# Patient Record
Sex: MALE | Race: WHITE | NOT HISPANIC OR LATINO | Employment: FULL TIME | URBAN - METROPOLITAN AREA
[De-identification: names, ages, dates, MRNs, and addresses within clinical notes are randomized per-mention and may not be internally consistent; named-entity substitution may affect disease eponyms.]

---

## 2018-06-13 ENCOUNTER — EVALUATION (OUTPATIENT)
Dept: PHYSICAL THERAPY | Facility: CLINIC | Age: 48
End: 2018-06-13
Payer: COMMERCIAL

## 2018-06-13 ENCOUNTER — TRANSCRIBE ORDERS (OUTPATIENT)
Dept: PHYSICAL THERAPY | Facility: CLINIC | Age: 48
End: 2018-06-13

## 2018-06-13 DIAGNOSIS — Z98.890 S/P LUMBAR DISCECTOMY: Primary | ICD-10-CM

## 2018-06-13 DIAGNOSIS — Z98.890 S/P LUMBAR DISCECTOMY: ICD-10-CM

## 2018-06-13 DIAGNOSIS — M54.50 BILATERAL LOW BACK PAIN WITHOUT SCIATICA, UNSPECIFIED CHRONICITY: Primary | ICD-10-CM

## 2018-06-13 PROCEDURE — G8990 OTHER PT/OT CURRENT STATUS: HCPCS

## 2018-06-13 PROCEDURE — G8991 OTHER PT/OT GOAL STATUS: HCPCS

## 2018-06-13 PROCEDURE — 97161 PT EVAL LOW COMPLEX 20 MIN: CPT

## 2018-06-13 PROCEDURE — 97110 THERAPEUTIC EXERCISES: CPT

## 2018-06-13 NOTE — LETTER
2018    Leandro Campos MD  [address]    Patient: Cheryle Barns   YOB: 1970   Date of Visit: 2018     Encounter Diagnosis     ICD-10-CM    1  S/P lumbar discectomy Z98 890        Dear Dr Ortiz Lora:    Please review the attached Plan of Care from [facility] recent visit  Please verify that you agree therapy should continue by signing the attached document and sending it back to our office  If you have any questions or concerns, please don't hesitate to call  Sincerely,    Asif Oakley PT      Referring Provider:      I certify that I have read the below Plan of Care and certify the need for these services furnished under this plan of treatment while under my care  Leandro Campos MD  [place] [id number]  [address]: [phone]          PT Evaluation     Today's date: 2018  Patient name: Cheryle Barns  : 1970  MRN: [id number]  Referring provider: Raven Campbell MD  Dx:   Encounter Diagnosis     ICD-10-CM    1  S/P lumbar discectomy Z98 890                   Assessment  Impairments: abnormal or restricted ROM, activity intolerance, impaired physical strength, lacks appropriate home exercise program, pain with function and poor body mechanics    Assessment details: Cheryle Barns is a 50 y o  male presents s/p L5-S1 discectomy on 5/3/18  Cheryle Barns has the above listed impairments and will benefit from skilled PT to improve deficits to return to prior level of function  Cheryle Barns was educated on eval findings and plan for management  Educated extensively on avoiding lumbar flexion with transfers, lifting, and ADL to prevent re-herniation  HEP initiated  Coit Idana would benefit from skilled services to improve ROM, strength, flexibility, and function, and to decrease pain      Understanding of Dx/Px/POC: good   Prognosis: good    Goals  2 wks  - No pain > 4/10  - Increase strength 1/3 grade  - Increase sitting tolerance at least 50%    6-8  wks  - Pain 0/10  - Strength 5/5  - Lumbar ROM WFL and painfree including repeated flexion in standing   - Independent with HEP for self management  - Functional Status Measure at least 61  - Return to baseline tolerance for sitting, donning socks/shoes  - RTW normal duty  Plan  Patient would benefit from: skilled physical therapy  Planned modality interventions: cryotherapy and thermotherapy: hydrocollator packs  Planned therapy interventions: patient education, postural training, strengthening, stretching, therapeutic exercise, transfer training, home exercise program and flexibility  Frequency: 3x week  Duration in visits: 12  Duration in weeks: 4  Treatment plan discussed with: patient        Subjective Evaluation    History of Present Illness  Date of surgery: 5/3/2018  Mechanism of injury: surgery  Mechanism of injury: Pt reporting RLE pain x 2 yrs, insidious onset  Does hard work shoveling, raking which may have contributed to onset  Would go to chiro for manipulation which managed RLE sx up until 1 yr ago, then chiro was ineffective  Pt had spine surgery consult, received MRI revealing R sided L5-S1 disc derangement  Now s/p R L5-S1 discectomy on 5/3/18  Chief complaint post op "I just have pain in my back, the leg is fine  I don't like sitting here" with LBP with prolonged sitting  Has been doing a lot of walking post-op  Pain  Current pain ratin  At best pain rating: 3  At worst pain ratin  Location: B lumbosacral  Quality: sharp  Relieving factors: change in position  Aggravating factors: sitting  Progression: improved (No RLE pain post op)    Social Support  Steps to enter house: yes  5  Stairs in house: yes   15  Lives in: multiple-level home  Lives with: spouse and adult children    Employment status: not working (Off from work post-op    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, shoveling, running equipment)    Diagnostic Tests  MRI studies: abnormal (R L5-S1 disc derangement pre-op)  Treatments  Previous treatment: chiropractic  Patient Goals  Patient goals for therapy: decreased pain, return to work, independence with ADLs/IADLs, return to sport/leisure activities, increased strength and increased motion          Objective    Posture/observation: 5 cm vertical lumbar incision healing well  Increased lumbar lordosis  Flowsheet Rows      Most Recent Value   PT/OT G-Codes   Current Score  40   Projected Score  61   FOTO information reviewed  Yes   Assessment Type  Evaluation   G code set  Other PT/OT Primary   Other PT Primary Current Status ()  CL   Other PT Primary Goal Status ()  CJ      Gait: no AD, normal     OH squat: normal, good balance  Pt able to heel walk, toe walk B  MMT Lumbosacral myotomes 5/5 B to include iliospoas, quadriceps, anterior tibialis, extensor hallucis, peroneals, and hamstrings  B glute med 5/5  Glute max: R 4-/5, L 4/5  Transverse abdominis cx Fair via palpation  Lumbar ROM: deferred post-op  Flexibility: B hamstrings moderately to severely tight via SLR  Hip flexors: R normal, L moderately tight  B gastroc moderately tight  Special tests: B SLR, Noreen's (-)  No LLD in supine        Precautions: no flexion ROM s/p discectomy    Daily Treatment Diary       Manuals 6/13 /18                                                                Exercise Diary              TA cx 10"x 15            Bridge 10" x15            H/L HS str             T-ball squat             Wall gastroc str             Kneel HF str             TM/elliptical             FSU             Prone plank             Prone swim                                                                                                                                                                         Modalities             MH PRN low back             Ice low back PRN

## 2018-06-13 NOTE — PROGRESS NOTES
PT Evaluation     Today's date: 2018  Patient name: Michael Mcgovern  : 1970  MRN: [id number]  Referring provider: Vandana Collins MD  Dx:   Encounter Diagnosis     ICD-10-CM    1  Bilateral low back pain without sciatica, unspecified chronicity M54 5    2  S/P lumbar discectomy Z98 890        Start Time: 1046  Stop Time: 3274  Total time in clinic (min): 36 minutes    Assessment  Impairments: abnormal or restricted ROM, activity intolerance, impaired physical strength, lacks appropriate home exercise program, pain with function and poor body mechanics    Assessment details: Michael Mcgovern is a 50 y o  male presents s/p L5-S1 discectomy on 5/3/18  Michael Mcgovern has the above listed impairments and will benefit from skilled PT to improve deficits to return to prior level of function  Michael Mcgovern was educated on eval findings and plan for management  Educated extensively on avoiding lumbar flexion with transfers, lifting, and ADL to prevent re-herniation  HEP initiated  Sandra Pastel would benefit from skilled services to improve ROM, strength, flexibility, and function, and to decrease pain  Understanding of Dx/Px/POC: good   Prognosis: good    Goals  2 wks  - No pain > 4/10  - Increase strength 1/3 grade  - Increase sitting tolerance at least 50%    6-8  wks  - Pain 0/10  - Strength 5/5  - Lumbar ROM WFL and painfree including repeated flexion in standing   - Independent with HEP for self management  - Functional Status Measure at least 61  - Return to baseline tolerance for sitting, donning socks/shoes  - RTW normal duty        Plan  Patient would benefit from: skilled physical therapy  Planned modality interventions: cryotherapy and thermotherapy: hydrocollator packs  Planned therapy interventions: patient education, postural training, strengthening, stretching, therapeutic exercise, transfer training, home exercise program and flexibility  Frequency: 3x week  Duration in visits: 12  Duration in weeks: 4  Treatment plan discussed with: patient        Subjective Evaluation    History of Present Illness  Date of surgery: 5/3/2018  Mechanism of injury: surgery  Mechanism of injury: Pt reporting RLE pain x 2 yrs, insidious onset  Does hard work shoveling, raking which may have contributed to onset  Would go to chiro for manipulation which managed RLE sx up until 1 yr ago, then chiro was ineffective  Pt had spine surgery consult, received MRI revealing R sided L5-S1 disc derangement  Now s/p R L5-S1 discectomy on 5/3/18  Chief complaint post op "I just have pain in my back, the leg is fine  I don't like sitting here" with LBP with prolonged sitting  Has been doing a lot of walking post-op  Pain  Current pain ratin  At best pain rating: 3  At worst pain ratin  Location: B lumbosacral  Quality: sharp  Relieving factors: change in position  Aggravating factors: sitting  Progression: improved (No RLE pain post op)    Social Support  Steps to enter house: yes  5  Stairs in house: yes   15  Lives in: multiple-level home  Lives with: spouse and adult children    Employment status: not working (Off from work post-op  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, shoveling, running equipment)    Diagnostic Tests  MRI studies: abnormal (R L5-S1 disc derangement pre-op)  Treatments  Previous treatment: chiropractic  Patient Goals  Patient goals for therapy: decreased pain, return to work, independence with ADLs/IADLs, return to sport/leisure activities, increased strength and increased motion          Objective    Posture/observation: 5 cm vertical lumbar incision healing well  Increased lumbar lordosis      Flowsheet Rows      Most Recent Value   PT/OT G-Codes   Current Score  40   Projected Score  61   FOTO information reviewed  Yes   Assessment Type  Evaluation   G code set  Other PT/OT Primary   Other PT Primary Current Status ()  CL   Other PT Primary Goal Status ()  CJ      Gait: no AD, normal     OH squat: normal, good balance  Pt able to heel walk, toe walk B  MMT Lumbosacral myotomes 5/5 B to include iliospoas, quadriceps, anterior tibialis, extensor hallucis, peroneals, and hamstrings  B glute med 5/5  Glute max: R 4-/5, L 4/5  Transverse abdominis cx Fair via palpation  Lumbar ROM: deferred post-op  Flexibility: B hamstrings moderately to severely tight via SLR  Hip flexors: R normal, L moderately tight  B gastroc moderately tight  Special tests: B SLR, Noreen's (-)  No LLD in supine        Precautions: no flexion ROM s/p discectomy    Daily Treatment Diary       Manuals 6/13 /18                                                                Exercise Diary              TA cx 10"x 15            Bridge 10" x15            H/L HS str             T-ball squat             Wall gastroc str             Kneel HF str             TM/elliptical             FSU             Prone plank             Prone swim                                                                                                                                                                         Modalities             MH PRN low back             Ice low back PRN

## 2018-06-14 ENCOUNTER — OFFICE VISIT (OUTPATIENT)
Dept: PHYSICAL THERAPY | Facility: CLINIC | Age: 48
End: 2018-06-14
Payer: COMMERCIAL

## 2018-06-14 DIAGNOSIS — M54.50 BILATERAL LOW BACK PAIN WITHOUT SCIATICA, UNSPECIFIED CHRONICITY: ICD-10-CM

## 2018-06-14 DIAGNOSIS — Z98.890 S/P LUMBAR DISCECTOMY: Primary | ICD-10-CM

## 2018-06-14 PROCEDURE — 97110 THERAPEUTIC EXERCISES: CPT

## 2018-06-14 NOTE — PROGRESS NOTES
Daily Note     Today's date: 2018  Patient name: Sandra Flores  : 1970  MRN: [id number]  Referring provider: Eloise Robbins MD  Dx:   Encounter Diagnosis     ICD-10-CM    1  S/P lumbar discectomy Z98 890    2  Bilateral low back pain without sciatica, unspecified chronicity M54 5                   Subjective: Pt reports feeling sore today with minimal pain  He states that he walked ~3 miles today  Objective: See treatment diary below    Precautions: no flexion ROM s/p discectomy    Daily Treatment Diary       Manuals                                                                Exercise Diary              TA cx 10"x 15 10"x15           Bridge 10" x15 10"x15           H/L HS str  20"x5           T-ball squat  15x           Wall gastroc str  20"x5           Kneel HF str  20"x5           TM/elliptical  -           FSU  4", 15x           Prone plank  -           Prone swim  -                                                                                                                                                                       Modalities             MH PRN low back  10'           Ice low back PRN                   Assessment: Pt started with MHP today due to complaints to increased soreness  Pt reports that the MHP helped to relieve symptoms  Pt required verbal cues for proper form throughout session  Pt reports decreased pain and stiffness at the end of session  Pt tolerated treatment session well and will continue to benefit from skilled PT to address functional impairments and pain  Plan: Continue per plan of care  Pt was seen by ANGUS Gomes under my supervision

## 2018-06-18 ENCOUNTER — OFFICE VISIT (OUTPATIENT)
Dept: PHYSICAL THERAPY | Facility: CLINIC | Age: 48
End: 2018-06-18
Payer: COMMERCIAL

## 2018-06-18 DIAGNOSIS — Z98.890 S/P LUMBAR DISCECTOMY: Primary | ICD-10-CM

## 2018-06-18 DIAGNOSIS — M54.50 BILATERAL LOW BACK PAIN WITHOUT SCIATICA, UNSPECIFIED CHRONICITY: ICD-10-CM

## 2018-06-18 PROCEDURE — 97110 THERAPEUTIC EXERCISES: CPT

## 2018-06-18 NOTE — PROGRESS NOTES
Daily Note     Today's date: 2018  Patient name: Adams Davis  : 1970  MRN: [id number]  Referring provider: Enoch Knox MD  Dx:   Encounter Diagnosis     ICD-10-CM    1  S/P lumbar discectomy Z98 890    2  Bilateral low back pain without sciatica, unspecified chronicity M54 5                   Subjective: "Pretty good today "  LBP 0/10 at rest        Objective: See treatment diary below    Precautions: no flexion ROM s/p discectomy    Daily Treatment Diary       Manuals                                                               Exercise Diary              TA cx 10"x 15 10"x15 x30          Bridge 10" x15 10"x15 30          H/L HS str  20"x5 x5          T-ball squat  15x 3x10          Wall gastroc str  20"x5 x5          Kneel HF str  20"x5 x5          TM/elliptical  -           FSU  4", 15x 2x10          Prone plank  -           Prone swim  -                                                                                                                                                                       Modalities             MH PRN low back  10'           Ice low back PRN                       Assessment: Tolerated treatment well  Patient would benefit from continued PT      Plan: Continue per plan of care

## 2018-06-20 ENCOUNTER — OFFICE VISIT (OUTPATIENT)
Dept: PHYSICAL THERAPY | Facility: CLINIC | Age: 48
End: 2018-06-20
Payer: COMMERCIAL

## 2018-06-20 DIAGNOSIS — Z98.890 S/P LUMBAR DISCECTOMY: Primary | ICD-10-CM

## 2018-06-20 DIAGNOSIS — M54.50 BILATERAL LOW BACK PAIN WITHOUT SCIATICA, UNSPECIFIED CHRONICITY: ICD-10-CM

## 2018-06-20 PROCEDURE — 97110 THERAPEUTIC EXERCISES: CPT

## 2018-06-20 NOTE — PROGRESS NOTES
Daily Note     Today's date: 2018  Patient name: Williams Jenkins  : 1970  MRN: [id number]  Referring provider: Theodore Saxena MD  Dx:   Encounter Diagnosis     ICD-10-CM    1  S/P lumbar discectomy Z98 890    2  Bilateral low back pain without sciatica, unspecified chronicity M54 5                   Subjective: "A little sore with bending backward, that's it "        Objective: See treatment diary below    Precautions: no flexion ROM s/p discectomy    Daily Treatment Diary       Manuals                                                              Exercise Diary              TA cx 10"x 15 10"x15 x30 -         Bridge 10" x15 10"x15 30 30         H/L HS str  20"x5 x5 x5         T-ball squat  15x 3x10 3x10         Wall gastroc str  20"x5 x5 x5         Kneel HF str  20"x5 x5 x5         TM/elliptical  -  TM 10'         FSU  4", 15x 2x10 3x10         Prone plank  -  Knees 10"x 10         Prone swim  -                                                                                                                                                                       Modalities             MH PRN low back  10'           Ice low back PRN                           Assessment: Tolerated treatment well  Patient exhibited good technique with therapeutic exercises  HEP, core strengthening progressed  Plan: Continue per plan of care

## 2018-06-22 ENCOUNTER — OFFICE VISIT (OUTPATIENT)
Dept: PHYSICAL THERAPY | Facility: CLINIC | Age: 48
End: 2018-06-22
Payer: COMMERCIAL

## 2018-06-22 DIAGNOSIS — Z98.890 S/P LUMBAR DISCECTOMY: Primary | ICD-10-CM

## 2018-06-22 DIAGNOSIS — M54.50 BILATERAL LOW BACK PAIN WITHOUT SCIATICA, UNSPECIFIED CHRONICITY: ICD-10-CM

## 2018-06-22 PROCEDURE — 97110 THERAPEUTIC EXERCISES: CPT

## 2018-06-22 NOTE — PROGRESS NOTES
Daily Note     Today's date: 2018  Patient name: Rosalio Pettit  : 1970  MRN: [id number]  Referring provider: Bonita Can MD  Dx:   Encounter Diagnosis     ICD-10-CM    1  S/P lumbar discectomy Z98 890    2  Bilateral low back pain without sciatica, unspecified chronicity M54 5                   Subjective: pt c/o LB soreness 1/10, no RLE sx  Objective: See treatment diary below    Precautions: no flexion ROM s/p discectomy    Daily Treatment Diary       Manuals                                                             Exercise Diary              TA cx 10"x 15 10"x15 x30 -         Bridge 10" x15 10"x15 30 30 U/L 10"x 15 ea        H/L HS str  20"x5 x5 x5 x5        T-ball squat  15x 3x10 3x10 3x10        Wall gastroc str  20"x5 x5 x5 x5        Kneel HF str  20"x5 x5 x5 x5        TM/elliptical  -  TM 10' 10'        FSU  4", 15x 2x10 3x10 3x10 6"       Prone plank  -  Knees 10"x 10 10        Prone swim  -           Lunges fwd,lat,rot     x10 ea                                                                                                                                                       Modalities             MH PRN low back  10'           Ice low back PRN     10'                Assessment: Tolerated treatment well  Patient exhibited good technique with therapeutic exercises and would benefit from continued PT      Plan: Continue per plan of care

## 2018-06-25 ENCOUNTER — OFFICE VISIT (OUTPATIENT)
Dept: PHYSICAL THERAPY | Facility: CLINIC | Age: 48
End: 2018-06-25
Payer: COMMERCIAL

## 2018-06-25 DIAGNOSIS — M54.50 BILATERAL LOW BACK PAIN WITHOUT SCIATICA, UNSPECIFIED CHRONICITY: ICD-10-CM

## 2018-06-25 DIAGNOSIS — Z98.890 S/P LUMBAR DISCECTOMY: Primary | ICD-10-CM

## 2018-06-25 PROCEDURE — G8991 OTHER PT/OT GOAL STATUS: HCPCS

## 2018-06-25 PROCEDURE — 97110 THERAPEUTIC EXERCISES: CPT

## 2018-06-25 PROCEDURE — 97010 HOT OR COLD PACKS THERAPY: CPT

## 2018-06-25 PROCEDURE — G8990 OTHER PT/OT CURRENT STATUS: HCPCS

## 2018-06-25 NOTE — PROGRESS NOTES
Daily Note     Today's date: 2018  Patient name: Pravin Teran  : 1970  MRN: [id number]  Referring provider: Bouchra Suárez MD  Dx:   Encounter Diagnosis     ICD-10-CM    1  S/P lumbar discectomy Z98 890    2  Bilateral low back pain without sciatica, unspecified chronicity M54 5                   Subjective: Patient reports continues with centralized symptoms but radicular symptoms have diminished to none  Objective: See treatment diary below  Precautions: no flexion ROM s/p discectomy    Daily Treatment Diary       Manuals                                                            Exercise Diary              TA cx 10"x 15 10"x15 x30 -         Bridge 10" x15 10"x15 30 30 U/L 10"x 15 ea U/L  10" x15ea       H/L HS str  20"x5 x5 x5 x5 x5       T-ball squat  15x 3x10 3x10 3x10 3x10       Wall gastroc str  20"x5 x5 x5 x5 x5       Kneel HF str  20"x5 x5 x5 x5 x5       TM/elliptical  -  TM 10' 10' 10'       FSU  4", 15x 2x10 3x10 3x10 6" 3x10       Prone plank  -  Knees 10"x 10 10        Prone swim  -           Lunges fwd,lat,rot     x10 ea 3x10 ea       Swissball LTR Isometric      x30 ea       swissball ab isometric      x30                                                                                                                            Modalities             MH PRN low back  10'           Ice low back PRN     10' 10'           Assessment: Tolerated treatment without expressed discomfort    Patient requires 25% verbal cueing to perform exercises properly  Patient appears to have decreasing symptoms and should continue to benefit from continued PT  Plan: Continue per plan of care

## 2018-06-27 ENCOUNTER — OFFICE VISIT (OUTPATIENT)
Dept: PHYSICAL THERAPY | Facility: CLINIC | Age: 48
End: 2018-06-27
Payer: COMMERCIAL

## 2018-06-27 DIAGNOSIS — Z98.890 S/P LUMBAR DISCECTOMY: Primary | ICD-10-CM

## 2018-06-27 DIAGNOSIS — M54.50 BILATERAL LOW BACK PAIN WITHOUT SCIATICA, UNSPECIFIED CHRONICITY: ICD-10-CM

## 2018-06-27 PROCEDURE — 97110 THERAPEUTIC EXERCISES: CPT

## 2018-06-27 NOTE — PROGRESS NOTES
Daily Note     Today's date: 2018  Patient name: Sandra Flores  : 1970  MRN: [id number]  Referring provider: Eloise Robbins MD  Dx:   Encounter Diagnosis     ICD-10-CM    1  S/P lumbar discectomy Z98 890    2  Bilateral low back pain without sciatica, unspecified chronicity M54 5                   Subjective: Patient reports when wakes up in the morning has more stiffness than pain  Reports the doctor has released him to contingently return to work on   No doctor follow up is scheduled at this point  Objective: See treatment diary below    Precautions: no flexion ROM s/p discectomy    Daily Treatment Diary       Manuals                                                           Exercise Diary              TA cx 10"x 15 10"x15 x30 -         Bridge 10" x15 10"x15 30 30 U/L 10"x 15 ea U/L  10" x15ea B  10"  x15      H/L HS str  20"x5 x5 x5 x5 x5 x5      T-ball squat  15x 3x10 3x10 3x10 3x10 3x10      Wall gastroc str  20"x5 x5 x5 x5 x5 x5      Kneel HF str  20"x5 x5 x5 x5 x5 x5      TM/elliptical  -  TM 10' 10' 10' 10'      FSU  4", 15x 2x10 3x10 3x10 6" 3x10 6"  3x10      Prone plank  -  Knees 10"x 10 10  Knees  10"  x10      Prone swim  -           Lunges fwd,lat,rot     x10 ea 3x10 ea 3x10  ea      Swissball LTR Isometric      x30 ea       swissball ab isometric      x30 x30                                                                                                                           Modalities             MH PRN low back  10'           Ice low back PRN     10' 10'           Assessment: Tolerated treatment well and appeared to have decreased stiffness post treatment    Patient will benefit from continued core stabilization and ROM activities  Plan: Continue per plan of care

## 2018-06-29 ENCOUNTER — OFFICE VISIT (OUTPATIENT)
Dept: PHYSICAL THERAPY | Facility: CLINIC | Age: 48
End: 2018-06-29
Payer: COMMERCIAL

## 2018-06-29 DIAGNOSIS — Z98.890 S/P LUMBAR DISCECTOMY: Primary | ICD-10-CM

## 2018-06-29 DIAGNOSIS — M54.50 BILATERAL LOW BACK PAIN WITHOUT SCIATICA, UNSPECIFIED CHRONICITY: ICD-10-CM

## 2018-06-29 PROCEDURE — 97110 THERAPEUTIC EXERCISES: CPT

## 2018-06-29 NOTE — PROGRESS NOTES
Daily Note     Today's date: 2018  Patient name: Bjorn Prakash  : 1970  MRN: [id number]  Referring provider: Sj Hart MD  Dx:   Encounter Diagnosis     ICD-10-CM    1  S/P lumbar discectomy Z98 890    2  Bilateral low back pain without sciatica, unspecified chronicity M54 5        Start Time: 8259  Stop Time: 0843  Total time in clinic (min): 51 minutes    Subjective: Patient notes a 4/10 VAS this AM and reports generalized joint stiffness  Objective: See treatment diary below    Precautions: no flexion ROM s/p discectomy    Daily Treatment Diary       Manuals                                                          Exercise Diary              TA cx 10"x 15 10"x15 x30 -         Bridge 10" x15 10"x15 30 30 U/L 10"x 15 ea U/L  10" x15ea B  10"  x15 15x10:     H/L HS str  20"x5 x5 x5 x5 x5 x5 x5     T-ball squat  15x 3x10 3x10 3x10 3x10 3x10 3x10     Wall gastroc str  20"x5 x5 x5 x5 x5 x5 5x20''     Kneel HF str  20"x5 x5 x5 x5 x5 x5 5x20''     TM/elliptical  -  TM 10' 10' 10' 10' 10'     FSU  4", 15x 2x10 3x10 3x10 6" 3x10 6"  3x10 3x10     Prone plank  -  Knees 10"x 10 10  Knees  10"  x10 10x10:     Prone swim  -           Lunges fwd,lat,rot     x10 ea 3x10 ea 3x10  ea 3x10     Swissball LTR Isometric      x30 ea  x30     swissball ab isometric      x30 x30 x30                                                                                                                          Modalities             MH PRN low back  10'           Ice low back PRN     10' 10'           Assessment: Patient reported that his core stability activities really challenged him this session  He noted generalized selective tissue tension with all ROM tasks and noted muscular fatigue post session  Patient is also concerned about his LBP with a  return to work given his variable work duties and stressors    Patient would benefit from continued lumbar stabilization activities to maximize his return to work function  Plan: Continue per plan of care

## 2018-07-09 ENCOUNTER — OFFICE VISIT (OUTPATIENT)
Dept: PHYSICAL THERAPY | Facility: CLINIC | Age: 48
End: 2018-07-09
Payer: COMMERCIAL

## 2018-07-09 DIAGNOSIS — Z98.890 S/P LUMBAR DISCECTOMY: Primary | ICD-10-CM

## 2018-07-09 DIAGNOSIS — M54.50 BILATERAL LOW BACK PAIN WITHOUT SCIATICA, UNSPECIFIED CHRONICITY: ICD-10-CM

## 2018-07-09 PROCEDURE — 97110 THERAPEUTIC EXERCISES: CPT

## 2018-07-09 NOTE — PROGRESS NOTES
Daily Note     Today's date: 2018  Patient name: Lilliam Worrell  : 1970  MRN: [id number]  Referring provider: Prosper Retana MD  Dx:   Encounter Diagnosis     ICD-10-CM    1  S/P lumbar discectomy Z98 890    2  Bilateral low back pain without sciatica, unspecified chronicity M54 5                   Subjective: Patient reports while away on vacation pain did not exceed a 4/10  Reports normal discomfort is right around a 1/10  Reports pain centralized in low back with no radicular symptoms  Objective: See treatment diary below  Precautions: no flexion ROM s/p discectomy    Daily Treatment Diary       Manuals                                                         Exercise Diary              TA cx 10"x 15 10"x15 x30 -         Bridge 10" x15 10"x15 30 30 U/L 10"x 15 ea U/L  10" x15ea B  10"  x15 15x10" 10"x  20    H/L HS str  20"x5 x5 x5 x5 x5 x5 x5 20"x5    T-ball squat  15x 3x10 3x10 3x10 3x10 3x10 3x10 3x10    Wall gastroc str  20"x5 x5 x5 x5 x5 x5 5x20'' 5x20"    Kneel HF str  20"x5 x5 x5 x5 x5 x5 5x20'' 5x20"    TM/elliptical  -  TM 10' 10' 10' 10' 10' 10' x 1 8 mph    FSU  4", 15x 2x10 3x10 3x10 6" 3x10 6"  3x10 3x10 3x10    Prone plank  -  Knees 10"x 10 10  Knees  10"  x10 10x10" 10x10"    Prone swim  -           Lunges fwd,lat,rot     x10 ea 3x10 ea 3x10  ea 3x10  3x10 ea    Swissball LTR Isometric      x30 ea  x30 2"x30ea    swissball ab isometric      x30 x30 x30 2"x30    DLS leg lower         3x10 ea                                                                                                            Modalities             MH PRN low back  10'           Ice low back PRN     10' 10' 10'          Assessment: Tolerated treatment well  Patient should see a reduction in symptoms with continued core stability program  Patient appears to have a good centralization of symptoms  Plan: Continue per plan of care

## 2018-07-11 ENCOUNTER — APPOINTMENT (OUTPATIENT)
Dept: PHYSICAL THERAPY | Facility: CLINIC | Age: 48
End: 2018-07-11
Payer: COMMERCIAL

## 2018-07-12 ENCOUNTER — OFFICE VISIT (OUTPATIENT)
Dept: PHYSICAL THERAPY | Facility: CLINIC | Age: 48
End: 2018-07-12
Payer: COMMERCIAL

## 2018-07-12 DIAGNOSIS — M54.50 BILATERAL LOW BACK PAIN WITHOUT SCIATICA, UNSPECIFIED CHRONICITY: ICD-10-CM

## 2018-07-12 DIAGNOSIS — Z98.890 S/P LUMBAR DISCECTOMY: Primary | ICD-10-CM

## 2018-07-12 PROCEDURE — 97110 THERAPEUTIC EXERCISES: CPT

## 2018-07-12 NOTE — PROGRESS NOTES
Daily Note     Today's date: 2018  Patient name: Michael Mcgovern  : 1970  MRN: [id number]  Referring provider: Vandana Collins MD  Dx:   Encounter Diagnosis     ICD-10-CM    1  S/P lumbar discectomy Z98 890    2  Bilateral low back pain without sciatica, unspecified chronicity M54 5                   Subjective: RTW yesterday, "I had it pretty easy, on the lawn tractor "  LBP 0/10   "I feel I am getting better with working and stuff  The mornings are the worst "      Objective: See treatment diary below    Precautions: no flexion ROM s/p discectomy    Daily Treatment Diary       Manuals                                                        Exercise Diary              TA cx 10"x 15 10"x15 x30 -         Bridge 10" x15 10"x15 30 30 U/L 10"x 15 ea U/L  10" x15ea B  10"  x15 15x10" 10"x  20 x30   H/L HS str  20"x5 x5 x5 x5 x5 x5 x5 20"x5    T-ball squat  15x 3x10 3x10 3x10 3x10 3x10 3x10 3x10 3x10   Wall gastroc str  20"x5 x5 x5 x5 x5 x5 5x20'' 5x20" x5   Kneel HF str  20"x5 x5 x5 x5 x5 x5 5x20'' 5x20" x5   TM/elliptical  -  TM 10' 10' 10' 10' 10' 10' x 1 8 mph 10'   FSU  4", 15x 2x10 3x10 3x10 6" 3x10 6"  3x10 3x10 3x10 3x10   Prone plank  -  Knees 10"x 10 10  Knees  10"  x10 10x10" 10x10" x10   Prone swim  -        x10   Lunges fwd,lat,rot     x10 ea 3x10 ea 3x10  ea 3x10  3x10 ea High /low med ball reach 3kg 2x10   Swissball LTR Isometric      x30 ea  x30 2"x30ea -   swissball ab isometric      x30 x30 x30 2"x30 -   DLS leg lower         3x10 ea -                                                                                                           Modalities             MH PRN low back  10'           Ice low back PRN     10' 10' 10'              Assessment: Tolerated treatment well  Patient would benefit from continued PT  Therex, core and functional strengthening progressed, pt feeling "looser" after visit  Plan: Continue per plan of care

## 2018-07-13 ENCOUNTER — APPOINTMENT (OUTPATIENT)
Dept: PHYSICAL THERAPY | Facility: CLINIC | Age: 48
End: 2018-07-13
Payer: COMMERCIAL

## 2018-07-16 ENCOUNTER — OFFICE VISIT (OUTPATIENT)
Dept: PHYSICAL THERAPY | Facility: CLINIC | Age: 48
End: 2018-07-16
Payer: COMMERCIAL

## 2018-07-16 DIAGNOSIS — M54.50 BILATERAL LOW BACK PAIN WITHOUT SCIATICA, UNSPECIFIED CHRONICITY: ICD-10-CM

## 2018-07-16 DIAGNOSIS — Z98.890 S/P LUMBAR DISCECTOMY: Primary | ICD-10-CM

## 2018-07-16 PROCEDURE — 97110 THERAPEUTIC EXERCISES: CPT

## 2018-07-16 NOTE — PROGRESS NOTES
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, and I was helping out a little bit with the shoveling and raking "  LBP 3-4/10  Objective: See treatment diary below    Precautions: no flexion ROM s/p discectomy    Daily Treatment Diary       Manuals                                                                 Exercise Diary              TA cx             Bridge 10" x30 u/l            H/L HS str 20"x5            T-ball squat 3x10            Wall gastroc str 20"x5            Kneel HF str 20"x5            TM/elliptical             FSU 6" 3x10            Prone plank Knees 10"x 10            Prone swim 2x10            Lunges fwd,lat,rot High/ low reach med ball 3 kg 3x10            Swissball LTR Isometric             swissball ab isometric             DLS leg lower                                                                                                                     Modalities             MH PRN low back             Ice low back PRN 10'                        Assessment: Tolerated treatment well  Patient would benefit from continued PT      Plan: Progress note during next visit

## 2018-07-18 ENCOUNTER — EVALUATION (OUTPATIENT)
Dept: PHYSICAL THERAPY | Facility: CLINIC | Age: 48
End: 2018-07-18
Payer: COMMERCIAL

## 2018-07-18 DIAGNOSIS — M54.50 BILATERAL LOW BACK PAIN WITHOUT SCIATICA, UNSPECIFIED CHRONICITY: ICD-10-CM

## 2018-07-18 DIAGNOSIS — Z98.890 S/P LUMBAR DISCECTOMY: Primary | ICD-10-CM

## 2018-07-18 PROCEDURE — 97164 PT RE-EVAL EST PLAN CARE: CPT

## 2018-07-18 PROCEDURE — G8991 OTHER PT/OT GOAL STATUS: HCPCS

## 2018-07-18 PROCEDURE — G8992 OTHER PT/OT  D/C STATUS: HCPCS

## 2018-07-18 PROCEDURE — 97110 THERAPEUTIC EXERCISES: CPT

## 2018-07-18 NOTE — PROGRESS NOTES
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  I was doing some shoveling and raking and it felt pretty good  The guys take care of me so I don't have to do too much "  Pt agreeable to D/C to [facility] today, to join fitness program here going forward  Reports no difficulty with sitting tolerance, "I can sit no trouble "  Reports difficulty with donning shoes, socks in morning due to low back stiffness which improves as day progresses  Pt has RTW normal duty  Objective: See treatment diary below    LBP: 0-4/10    FOTO functional score 56, was 40 at eval   Predicted score 61  Lumbar ROM: flexion 60 deg, low back tightness  Repeated flexion in standing with improved ROM to 75 deg, decreased stiffness  Ext 25 deg, LB tightness  SBR 20 deg, no effect (NE)  SBL 25 deg, NE   R rotation 82 deg, NE   L rotation 67 deg, NE     MMT glute max B 5/5  Transverse abdominis cx Good via palpation          Precautions: no flexion ROM s/p discectomy    Daily Treatment Diary       Manuals                                                                Exercise Diary              TA cx             Bridge 10" x30 u/l 30           H/L HS str 20"x5 x5           T-ball squat 3x10 3x10           Wall gastroc str 20"x5 x5           Kneel HF str 20"x5 x5           TM/elliptical             FSU 6" 3x10 3x10           Prone plank Knees 10"x 10 x10           Prone swim 2x10 3x10           Lunges fwd,lat,rot High/ low reach med ball 3 kg 3x10 3x10           Swissball LTR Isometric             swissball ab isometric             DLS leg lower             SKTC  20"x5           DKTC  20"x5 Modalities              PRN low back             Ice low back PRN 10'                          Assessment: Tolerated treatment well  Patient exhibited good technique with therapeutic exercises   HEP progressed to include gentle Migel flexion exercises to decrease morning stiffness with donning shoes/socks  Goals  2 wks  - No pain > 4/10 met - met  - Increase strength 1/3 grade - met  - Increase sitting tolerance at least 50% -met    6-8  wks  - Pain 0/10 - not met  - Strength 5/5 - met  - Lumbar ROM WFL and painfree including repeated flexion in standing  - met for ROM, tightness with flexion improved with repeated movements  HEP progressed accordingly  - Independent with [facility] for self management - met  - Functional Status Measure at least 61 - not met  - Return to baseline tolerance for sitting, donning socks/shoes - not met  - RTW normal duty  - met    Liv White has been compliant with attending PT and home exercise program since initial helen Sutton  has made improvements in objective data since initial evalulation  Patient reports having improved level or function, has RTW normal duty  It was mutually agreed to Discharge to home exercise, gym fitness program at this time          Plan: D/C to [facility], fitness program

## 2023-11-30 ENCOUNTER — APPOINTMENT (OUTPATIENT)
Dept: URGENT CARE | Facility: CLINIC | Age: 53
End: 2023-11-30

## 2024-04-03 ENCOUNTER — APPOINTMENT (OUTPATIENT)
Dept: URGENT CARE | Facility: MEDICAL CENTER | Age: 54
End: 2024-04-03